# Patient Record
(demographics unavailable — no encounter records)

---

## 2024-12-16 NOTE — PHYSICAL EXAM
[Alert] : alert [Normal Voice/Communication] : normal voice/communication [Healthy Appearing] : healthy appearing [No Acute Distress] : no acute distress [Sclera] : the sclera and conjunctiva were normal [Hearing Threshold Finger Rub Not Story] : hearing was normal [Normal Lips/Gums] : the lips and gums were normal [Oropharynx] : the oropharynx was normal [Normal Appearance] : the appearance of the neck was normal [No Neck Mass] : no neck mass was observed [No Respiratory Distress] : no respiratory distress [No Acc Muscle Use] : no accessory muscle use [Respiration, Rhythm And Depth] : normal respiratory rhythm and effort [Auscultation Breath Sounds / Voice Sounds] : lungs were clear to auscultation bilaterally [Heart Rate And Rhythm] : heart rate was normal and rhythm regular [Normal S1, S2] : normal S1 and S2 [Murmurs] : no murmurs [None] : no edema [Bowel Sounds] : normal bowel sounds [Abdomen Tenderness] : non-tender [No Masses] : no abdominal mass palpated [Abdomen Soft] : soft [] : no hepatosplenomegaly [Cervical Lymph Nodes Enlarged Posterior Bilaterally] : no posterior cervical lymphadenopathy [Supraclavicular Lymph Nodes Enlarged Bilaterally] : no supraclavicular lymphadenopathy [No CVA Tenderness] : no CVA  tenderness [Abnormal Walk] : normal gait [Motor Exam] : the motor exam was normal [Normal] : oriented to person, place, and time [Oriented To Time, Place, And Person] : oriented to person, place, and time [Normal Affect] : the affect was normal [Normal Mood] : the mood was normal

## 2024-12-16 NOTE — HISTORY OF PRESENT ILLNESS
[FreeTextEntry1] : 22-year-old man with GERD.  He complains of difficulty breathing when he lays down to go to bed at night.  This is better when he lies flat.  He denies reflux symptoms at that time.  He has never been evaluated for FABIO.  He does complain of episodes of anxiety and panic attack

## 2024-12-16 NOTE — ASSESSMENT
[FreeTextEntry1] : Acid reflux. Dietary and lifestyle modification discussed with the patient.  Continue famotidine. Panic attack with shortness of breath.  Rule out FABIO.  Patient referred to pulmonary medicine.  This was discussed with the patient.  He understands and all questions were answered.

## 2025-01-28 NOTE — PHYSICAL EXAM
[No Acute Distress] : no acute distress [Well Nourished] : well nourished [Well Developed] : well developed [IV] : Mallampati Class: IV [Low Lying Soft Palate] : low lying soft palate [No Resp Distress] : no resp distress [No Acc Muscle Use] : no acc muscle use [Clear to Auscultation Bilaterally] : clear to auscultation bilaterally [Oriented x3] : oriented x3

## 2025-01-28 NOTE — HISTORY OF PRESENT ILLNESS
[Never] : never [TextBox_4] : WILFRID CALLOWAY is a 22 year old male who presents with intermittent episodes of poor sleep  had an episode a few months ago with difficulty sleeping / shortness of breath when trying to sleep may be panic attack?  family history of snoring  he is unaware of snoring  not really remembering dreams no known apneas some dry mouth no headaches no caffeine  variable bedtime/wake up time denies nocturia

## 2025-02-25 NOTE — HISTORY OF PRESENT ILLNESS
[Never] : never [TextBox_4] : WILFRID CALLOWAY is a 22 year old male [facility] episodes of poor sleep

## 2025-02-25 NOTE — PHYSICAL EXAM
[No Acute Distress] : no acute distress [Well Nourished] : well nourished [Well Developed] : well developed [No Resp Distress] : no resp distress [No Acc Muscle Use] : no acc muscle use [Clear to Auscultation Bilaterally] : clear to auscultation bilaterally [Oriented x3] : oriented x3 [No Focal Deficits] : no focal deficits